# Patient Record
Sex: MALE | Race: WHITE | NOT HISPANIC OR LATINO | Employment: STUDENT | URBAN - METROPOLITAN AREA
[De-identification: names, ages, dates, MRNs, and addresses within clinical notes are randomized per-mention and may not be internally consistent; named-entity substitution may affect disease eponyms.]

---

## 2020-09-18 ENCOUNTER — NURSE TRIAGE (OUTPATIENT)
Dept: OTHER | Facility: OTHER | Age: 20
End: 2020-09-18

## 2020-09-18 NOTE — TELEPHONE ENCOUNTER
Reason for Disposition   Blood in bowel movements  (Exception: Blood on surface of BM with constipation)    Answer Assessment - Initial Assessment Questions  1  LOCATION: "Where does it hurt?"       The whole abdomen  2  RADIATION: "Does the pain shoot anywhere else?" (e g , chest, back)      no  3  ONSET: "When did the pain begin?" (Minutes, hours or days ago)       One week ago  4  SUDDEN: "Gradual or sudden onset?"      It was sudden  5  PATTERN "Does the pain come and go, or is it constant?"     - If constant: "Is it getting better, staying the same, or worsening?"     The abdominal pain is intermittent      6  SEVERITY: "How bad is the pain?"  (e g , Scale 1-10; mild, moderate, or severe)     - MILD (1-3): doesn't interfere with normal activities, abdomen soft and not tender to touch      - MODERATE (4-7): interferes with normal activities or awakens from sleep, tender to touch      - SEVERE (8-10): excruciating pain, doubled over, unable to do any normal activities        # 6 intermittently    7  RECURRENT SYMPTOM: "Have you ever had this type of abdominal pain before?" If so, ask: "When was the last time?" and "What happened that time?"       This is recurrent he has ulcerative colitis    8  CAUSE: "What do you think is causing the abdominal pain?"      Colitis flare    9  RELIEVING/AGGRAVATING FACTORS: "What makes it better or worse?" (e g , movement, antacids, bowel movement)          10  OTHER SYMPTOMS: "Has there been any vomiting, diarrhea, constipation, or urine problems?"        Having a flare, bloody stools,unable to eat  Mom is concerned about his lab work  c reactive is high       Protocols used: ABDOMINAL PAIN - MALE-ADULT-

## 2020-09-18 NOTE — TELEPHONE ENCOUNTER
Regarding: Ulcerative Colitis C-Reactive Levels  ----- Message from Kiah Roberts sent at 9/18/2020  5:54 PM EDT -----  "My son has ulcerative colitis  His recent c-reactive lab result came in at 105   Should I be taking him to the ER?"

## 2020-09-18 NOTE — TELEPHONE ENCOUNTER
Dr Zaira Ashley was made aware and Pt is to be seen in the ED  They are  going to be evaluated at [address] [facility] ED